# Patient Record
Sex: FEMALE | Race: ASIAN | NOT HISPANIC OR LATINO
[De-identification: names, ages, dates, MRNs, and addresses within clinical notes are randomized per-mention and may not be internally consistent; named-entity substitution may affect disease eponyms.]

---

## 2021-12-02 PROBLEM — Z00.00 ENCOUNTER FOR PREVENTIVE HEALTH EXAMINATION: Status: ACTIVE | Noted: 2021-12-02

## 2021-12-06 ENCOUNTER — APPOINTMENT (OUTPATIENT)
Dept: OTOLARYNGOLOGY | Facility: CLINIC | Age: 66
End: 2021-12-06
Payer: COMMERCIAL

## 2021-12-06 VITALS
DIASTOLIC BLOOD PRESSURE: 82 MMHG | HEIGHT: 65 IN | WEIGHT: 138 LBS | SYSTOLIC BLOOD PRESSURE: 119 MMHG | OXYGEN SATURATION: 98 % | BODY MASS INDEX: 22.99 KG/M2 | HEART RATE: 59 BPM | TEMPERATURE: 97.8 F

## 2021-12-06 DIAGNOSIS — K13.70 UNSPECIFIED LESIONS OF ORAL MUCOSA: ICD-10-CM

## 2021-12-06 PROCEDURE — 99203 OFFICE O/P NEW LOW 30 MIN: CPT | Mod: 25

## 2021-12-06 PROCEDURE — 31575 DIAGNOSTIC LARYNGOSCOPY: CPT

## 2021-12-06 NOTE — PROCEDURE
[de-identified] : -\par Pre-operative Diagnosis: concern for head and neck lesion\par Post-operative Diagnosis: normal exam\par Anesthesia: Topical - 1 % Lidocaine/Phenylephrine\par Procedure:  Flexible Laryngoscopy\par  \par Procedure Details:  \par The patient was placed in the sitting position.  After decongestant and anesthesia were applied the laryngoscope was passed.  The nasal cavities, nasopharynx, oropharynx, hypopharynx, and larynx were all examined.  Vocal folds were examined during respiration and phonation.  The following findings were noted:\par \par Findings:  \par Nose: Septum is midline, turbinates are normal, nasal airways patent, mucosa normal\par Nasopharynx: Adenoids normal, no masses, eustachian tube normal\par Oropharynx: Pharyngeal walls symmetric and without lesion. Tonsils/fossae symmetric\par Hypopharynx: Hypopharynx and pyriform sinuses without lesion. No masses or asymmetry.  No pooling of secretions.\par Larynx:  Epiglottis and aryepiglottic folds were sharp and crisp bilaterally.  Bilateral false and true vocal folds normal appearance. Bilateral vocal folds fully mobile and symmetric.  Airway was widely patent.\par \par Condition: Stable.  Patient tolerated procedure well.\par \par Complications: None\par \par

## 2021-12-06 NOTE — PHYSICAL EXAM
[Midline] : trachea located in midline position [Laryngoscopy Performed] : laryngoscopy was performed, see procedure section for findings [Normal] : no rashes [de-identified] : mild overgrowth, protuberence, left mesial side of gumes posteriorly.  no bleeding or ulceration.  soft on palpation

## 2021-12-06 NOTE — ASSESSMENT
[FreeTextEntry1] : 67 yo female with concern for oral lesion.  This has been longstanding, and she is asymptomatic today.  On exam there were no identified lesions.  Maybe some slight overgrowth of the left mesial maxilla/gums posteriorly.  I advised observation and follow up if symptoms change.  If uncomfortable or causing distress, may benefit from eval with oral pathologist or OMFS.

## 2021-12-06 NOTE — HISTORY OF PRESENT ILLNESS
[de-identified] : 67 yo female who presents with an oral mass.  She notes it has been presents for 5 years.  It is in the oral cavity on the left side.  It is not changing in size.  It is not bleeding or ulcerating.  No issues chewing, eating or swallowing.  No voice changes or breathing issues.  No fevers, night sweats or weight loss.  No referred otalgia.  No tobacco hx, no alcohol use.